# Patient Record
Sex: FEMALE | URBAN - METROPOLITAN AREA
[De-identification: names, ages, dates, MRNs, and addresses within clinical notes are randomized per-mention and may not be internally consistent; named-entity substitution may affect disease eponyms.]

---

## 2017-05-31 ENCOUNTER — IMPORTED ENCOUNTER (OUTPATIENT)
Dept: URBAN - METROPOLITAN AREA CLINIC 1 | Facility: CLINIC | Age: 42
End: 2017-05-31

## 2017-05-31 PROBLEM — H52.13: Noted: 2017-05-31

## 2017-05-31 PROBLEM — H52.4: Noted: 2017-05-31

## 2017-05-31 PROCEDURE — S0620 ROUTINE OPHTHALMOLOGICAL EXA: HCPCS

## 2017-05-31 NOTE — PATIENT DISCUSSION
1. Myopia: Rx was given for correction if indicated and requested. 2. PresbyopiaReturn for an appointment in 1 year 36 with Dr. Mar Akers.

## 2022-04-02 ASSESSMENT — KERATOMETRY
OD_AXISANGLE2_DEGREES: 093
OS_K1POWER_DIOPTERS: 43.00
OS_AXISANGLE_DEGREES: 006
OS_AXISANGLE2_DEGREES: 096
OD_K1POWER_DIOPTERS: 42.50
OS_K2POWER_DIOPTERS: 44.00
OD_K2POWER_DIOPTERS: 44.50
OD_AXISANGLE_DEGREES: 003

## 2022-04-02 ASSESSMENT — VISUAL ACUITY
OD_CC: 20/25
OS_CC: 20/25
OS_CC: J1
OD_CC: J1

## 2022-04-02 ASSESSMENT — TONOMETRY
OS_IOP_MMHG: 12
OD_IOP_MMHG: 12

## 2023-01-31 RX ORDER — ATENOLOL 25 MG/1
25 TABLET ORAL DAILY
COMMUNITY
Start: 2022-12-27

## 2023-01-31 RX ORDER — METHIMAZOLE 5 MG/1
5 TABLET ORAL
COMMUNITY